# Patient Record
Sex: FEMALE | URBAN - METROPOLITAN AREA
[De-identification: names, ages, dates, MRNs, and addresses within clinical notes are randomized per-mention and may not be internally consistent; named-entity substitution may affect disease eponyms.]

---

## 2023-12-07 ENCOUNTER — OFFICE VISIT (OUTPATIENT)
Age: 58
End: 2023-12-07

## 2023-12-07 VITALS
DIASTOLIC BLOOD PRESSURE: 86 MMHG | TEMPERATURE: 98.2 F | HEART RATE: 84 BPM | OXYGEN SATURATION: 98 % | RESPIRATION RATE: 18 BRPM | SYSTOLIC BLOOD PRESSURE: 126 MMHG

## 2023-12-07 DIAGNOSIS — J02.9 SORE THROAT: Primary | ICD-10-CM

## 2023-12-07 LAB
Lab: NORMAL
QC PASS/FAIL: NORMAL
S PYO AG THROAT QL: NORMAL
SARS-COV-2 RDRP RESP QL NAA+PROBE: NEGATIVE

## 2023-12-07 NOTE — PATIENT INSTRUCTIONS
COVID and Strep negative. Reassuring exam today. Increasing Prednisone today should be helpful. Continue with conservative care for sore throat. See printed material.  Return or seek further medical attention if symptoms change or worsen.